# Patient Record
Sex: MALE | Race: WHITE
[De-identification: names, ages, dates, MRNs, and addresses within clinical notes are randomized per-mention and may not be internally consistent; named-entity substitution may affect disease eponyms.]

---

## 2017-07-16 ENCOUNTER — HOSPITAL ENCOUNTER (EMERGENCY)
Dept: HOSPITAL 62 - ER | Age: 22
LOS: 1 days | Discharge: HOME | End: 2017-07-17
Payer: COMMERCIAL

## 2017-07-16 VITALS — SYSTOLIC BLOOD PRESSURE: 135 MMHG | DIASTOLIC BLOOD PRESSURE: 70 MMHG

## 2017-07-16 DIAGNOSIS — F17.200: ICD-10-CM

## 2017-07-16 DIAGNOSIS — K61.1: Primary | ICD-10-CM

## 2017-07-16 PROCEDURE — 10080 I&D PILONIDAL CYST SIMPLE: CPT

## 2017-07-16 PROCEDURE — A6266 IMPREG GAUZE NO H20/SAL/YARD: HCPCS

## 2017-07-16 PROCEDURE — 0H98XZZ DRAINAGE OF BUTTOCK SKIN, EXTERNAL APPROACH: ICD-10-PCS | Performed by: EMERGENCY MEDICINE

## 2017-07-16 PROCEDURE — 96372 THER/PROPH/DIAG INJ SC/IM: CPT

## 2017-07-16 PROCEDURE — 99283 EMERGENCY DEPT VISIT LOW MDM: CPT

## 2017-07-16 PROCEDURE — S0119 ONDANSETRON 4 MG: HCPCS

## 2017-07-16 NOTE — ER DOCUMENT REPORT
ED Skin Rash/Insect Bite/Abscs





- General


Chief Complaint: Rectal Abscess


Stated Complaint: POSSIBLE ABSCESS


Time Seen by Provider: 07/16/17 22:17


Notes: 


Patient is a 22-year-old male who comes emergency department for chief 

complaint of 3 days of worsening pain in his left upper buttock area and lower 

back.  He states that it is difficult to sit down.  He has no trouble with 

bowel movements.  He denies fever or chills.  He is not a diabetic.  He denies 

history of the same.





TRAVEL OUTSIDE OF THE [place] IN LAST 30 DAYS: No





- Related Data


Allergies/Adverse Reactions: 


 





No Known Allergies Allergy (Unverified 11/28/16 14:02)


 











Past Medical History





- General


Information source: Patient





- Social History


Smoking Status: Current Every Day Smoker


Frequency of alcohol use: None


Drug Abuse: None


Lives with: Friend


Family History: None


Patient has suicidal ideation: No


Patient has homicidal ideation: No


Pulmonary Medical History: Reports: Hx Pneumonia


Renal/ Medical History: Denies: Hx Peritoneal Dialysis


Surgical Hx: Negative





- Immunizations


Hx Diphtheria, Pertussis, Tetanus Vaccination: Yes





Review of Systems





- Review of Systems


Constitutional: No symptoms reported


EENT: No symptoms reported


Cardiovascular: No symptoms reported


Respiratory: No symptoms reported


Gastrointestinal: No symptoms reported


Genitourinary: No symptoms reported


Male Genitourinary: No symptoms reported


Musculoskeletal: No symptoms reported


Skin: See HPI


Hematologic/Lymphatic: No symptoms reported


Neurological/Psychological: No symptoms reported





Physical Exam





- Vital signs


Vitals: 


 











Temp Pulse Resp BP Pulse Ox


 


 99.1 F   93   18   135/70 H  98 


 


 07/16/17 21:49  07/16/17 21:49  07/16/17 21:49  07/16/17 21:49  07/16/17 21:49











Interpretation: Normal





- General


General appearance: Appears well, Alert


In distress: None - Patient has difficulty sitting comfortably but otherwise he 

does not appear to be in distress





- HEENT


Head: Normocephalic, Atraumatic


Eyes: Normal


Conjunctiva: Normal


Extraocular movements intact: Yes


Eyelashes: Normal


Pupils: PERRL


Sinus: Normal


Nasal: Normal


Mouth/Lips: Normal


Mucous membranes: Normal


Pharynx: Normal


Neck: Normal





- Respiratory


Respiratory status: No respiratory distress


Chest status: Nontender


Breath sounds: Normal.  No: Decreased air movement, Wheezing


Chest palpation: Normal





- Cardiovascular


Rhythm: Regular.  No: Tachycardia


Heart sounds: Normal auscultation, S1 appreciated, S2 appreciated


Murmur: No





- Abdominal


Inspection: Normal


Distension: No distension


Bowel sounds: Normal


Tenderness: Nontender


Organomegaly: No organomegaly





- Back


Back: Normal, Nontender.  No: Tender





- Extremities


General upper extremity: Normal inspection, Nontender, Normal ROM, Normal 

strength


General lower extremity: Normal inspection, Nontender, Normal ROM, Normal 

strength





- Neurological


Neuro grossly intact: Yes


Cognition: Normal


Orientation: AAOx4


[place] Coma Scale Eye Opening: Spontaneous


[place] Coma Scale Verbal: Oriented


Carlos Alberto Coma Scale Motor: Obeys Commands


Carlos Alberto Coma Scale Total: 15


Speech: Normal


Cranial nerves: Normal


Cerebellar coordination: Normal


Motor strength normal: LUE, RUE, LLE, RLE


Additional motor exam normals: Equal 


Sensory: Normal





- Psychological


Associated symptoms: Normal affect, Normal mood





- Skin


Skin Temperature: Warm


Skin Moisture: Dry


Skin Color: Normal


Skin irregularity: Abscess - Left-sided pilonidal cyst abscess, mild 

surrounding erythema, mild swelling to the area, fluctuant.





Course





- Re-evaluation


Re-evalutation: 


07/16/17 22:31


[facility] [place] [place] this before, however patient 

specifically tells me this is not true, this is the first time.





Patient with pilonidal cyst abscess, this was cleaned, drained, packed, patient 

placed on antibiotics, patient given pain medication.  Discussed care, follow-up

, return precautions, patient also given surgical referral in case he needs it.

  This was discussed in detail.  Patient states understanding and agreement.





- Vital Signs


Vital signs: 


 











Temp Pulse Resp BP Pulse Ox


 


 99.1 F   93   18   135/70 H  98 


 


 07/16/17 21:49  07/16/17 21:49  07/16/17 21:49  07/16/17 21:49  07/16/17 21:49














Procedures





- Incision and Drainage


  ** left sided pilonidal cyst abscess


Type: Single


Anesthetic type: 1% Lidocaine


mL's of anesthetic: 8


I&D procedure: Shurclens applied - surgical cleanser, Sterile dressing applied


Incision Method: Incision made by scalpel


Notes: 


About 20 ccs of purulent material expressed; explored, cleaned, packed.





Discharge





- Discharge


Clinical Impression: 


 Pilonidal cyst with abscess





Condition: Stable


Disposition: HOME, SELF-CARE


Additional Instructions: 


You have been evaluated and treated today for a pilonidal cyst abscess.


Packing needs to come out/be exchanged in hours.


Keep clean, keep absorbing dressing over the site, take the antibiotic 

prescribed.


The pain medication if needed.


I recommend followup with the surgical referral if this continues to happen.


Return to emergency department immediately for any concerning or worsening 

symptoms including severe pain or swelling, spreading redness, fever, etc.


Prescriptions: 


Morphine Sulfate [Morphine Ir 15 Mg Tablet] 15 mg PO Q4HP PRN #12 tablet


 PRN Reason: 


Sulfamethoxazole/Trimethoprim [Bactrim Ds Tablet] 1 each PO BID #10 tablet


Forms:  Return to Work


Referrals: 


[facility] [Provider Group] - Follow up as needed

## 2017-07-18 ENCOUNTER — HOSPITAL ENCOUNTER (EMERGENCY)
Dept: HOSPITAL 62 - ER | Age: 22
Discharge: HOME | End: 2017-07-18
Payer: COMMERCIAL

## 2017-07-18 VITALS — SYSTOLIC BLOOD PRESSURE: 140 MMHG | DIASTOLIC BLOOD PRESSURE: 49 MMHG

## 2017-07-18 DIAGNOSIS — F17.200: ICD-10-CM

## 2017-07-18 DIAGNOSIS — Z48.01: Primary | ICD-10-CM

## 2017-07-18 PROCEDURE — 99282 EMERGENCY DEPT VISIT SF MDM: CPT

## 2017-07-18 NOTE — ER DOCUMENT REPORT
HPI





- HPI


Patient complains to provider of: Abscess I and D wound check


Onset: Other - Sunday


Onset/Duration: Better


Pain Level: 3


Context: 


22-year-old male had a pilonidal cyst abscess I&D done Sunday.  He is here for 

a wound recheck.  Feels a lot better and he has been going to work.  He came 

today to get the packing removed.  No fever.


Associated Symptoms: None


Exacerbated by: Denies


Relieved by: Denies


Similar symptoms previously: No


Recently seen / treated by doctor: No





- ROS


ROS below otherwise negative: Yes


Systems Reviewed and Negative: Yes All other systems reviewed and negative





- REPRODUCTIVE


Reproductive: DENIES: Pregnant:





- DERM


Skin Color: Normal





Past Medical History





- General


Information source: Patient





- Social History


Smoking Status: Current Every Day Smoker


Frequency of alcohol use: None


Drug Abuse: None


Lives with: Spouse/Significant other


Family History: None


Patient has suicidal ideation: No


Patient has homicidal ideation: No


Pulmonary Medical History: Reports: Hx Pneumonia


Renal/ Medical History: Denies: Hx Peritoneal Dialysis


Past Surgical History: Reports: Hx Oral Surgery - wisdom teeth





- Immunizations


Hx Diphtheria, Pertussis, Tetanus Vaccination: Yes





Vertical Provider Document





- CONSTITUTIONAL


Agree With Documented VS: Yes


Exam Limitations: No Limitations


General Appearance: No Apparent Distress





- INFECTION CONTROL


TRAVEL OUTSIDE OF THE [place] IN LAST 30 DAYS: No





- HEENT


HEENT: Normocephalic





- NECK


Neck: Supple





- RESPIRATORY


O2 Sat by Pulse Oximetry: 97





- NEURO


Level of Consciousness: Awake, Alert, Appropriate





- DERM


Integumentary: Abscess - Packing removed from the left pilonidal abscess 

incision and drainage there is minimal erythema and pus.





Course





- Vital Signs


Vital signs: 


 











Temp Pulse Resp BP Pulse Ox


 


 99.1 F   75   17   140/49 H  97 


 


 07/18/17 18:20  07/18/17 18:20  07/18/17 18:20  07/18/17 18:20  07/18/17 18:20














Discharge





- Discharge


Clinical Impression: 


 Abscess I and D recheck





Condition: Good


Disposition: HOME, SELF-CARE


Instructions:  Abscess ([facility])


Additional Instructions: 


Shower daily using antibacterial soap and vigorous washing with a washcloth in 

the shower pressure over the wound





Finish antibiotics





Dry dressing





return to the emergency room any concerns





Please complete the patient satisfaction survey if you get one, and return it.. 

If you do not receive a survey,  then you can go to the Critical access hospital website, [email or web address] 

and place your comments about your very good care. Thank you very much. It was 

a pleasure being your medical provider today.

## 2017-07-30 ENCOUNTER — HOSPITAL ENCOUNTER (EMERGENCY)
Dept: HOSPITAL 62 - ER | Age: 22
Discharge: HOME | End: 2017-07-30
Payer: COMMERCIAL

## 2017-07-30 VITALS — DIASTOLIC BLOOD PRESSURE: 61 MMHG | SYSTOLIC BLOOD PRESSURE: 140 MMHG

## 2017-07-30 DIAGNOSIS — H60.501: Primary | ICD-10-CM

## 2017-07-30 DIAGNOSIS — F17.200: ICD-10-CM

## 2017-07-30 DIAGNOSIS — R03.0: ICD-10-CM

## 2017-07-30 DIAGNOSIS — H92.01: ICD-10-CM

## 2017-07-30 PROCEDURE — 99282 EMERGENCY DEPT VISIT SF MDM: CPT

## 2017-07-30 NOTE — ER DOCUMENT REPORT
HPI





- HPI


Patient complains to provider of: r ear pain


Onset: Other - 3 days


Onset/Duration: Persistent


Quality of pain: Sharp


Pain Level: 4


Context: 


Patient presents complaining of right ear pain with swelling and drainage from 

his right ear.  Symptoms have been going on for the past 3 days.  Patient 

denies any fever.


Associated Symptoms: Earache.  denies: Fever


Exacerbated by: Denies


Relieved by: Denies


Similar symptoms previously: No


Recently seen / treated by doctor: No





- ROS


ROS below otherwise negative: Yes


Systems Reviewed and Negative: Yes All other systems reviewed and negative





- CONSTITUTIONAL


Constitutional: DENIES: Fever





- EENT


EENT: REPORTS: Ear Pain





- GASTROINTESTINAL


Gastrointestinal: DENIES: Nausea, Patient vomiting





- REPRODUCTIVE


Reproductive: DENIES: Pregnant:





- MUSCULOSKELETAL


Musculoskeletal: DENIES: Neck Pain





- DERM


Skin Color: Normal


Skin Problems: None





Past Medical History





- General


Information source: Patient





- Social History


Smoking Status: Current Every Day Smoker


Frequency of alcohol use: None


Drug Abuse: None


Occupation: 


Family History: None


Patient has suicidal ideation: No


Patient has homicidal ideation: No





- Medical History


Medical History: Negative


Pulmonary Medical History: Reports: Hx Pneumonia


Renal/ Medical History: Denies: Hx Peritoneal Dialysis


Past Surgical History: Reports: Hx Oral Surgery - wisdom teeth





- Immunizations


Hx Diphtheria, Pertussis, Tetanus Vaccination: Yes





Vertical Provider Document





- CONSTITUTIONAL


Agree With Documented VS: Yes


Exam Limitations: No Limitations


General Appearance: WD/WN, No Apparent Distress





- INFECTION CONTROL


TRAVEL OUTSIDE OF THE [place] IN LAST 30 DAYS: No





- HEENT


HEENT: Atraumatic, Normocephalic.  negative: Pharyngeal Exudate, Pharyngeal 

Tenderness, Pharyngeal Erythema


Notes: 


Patient with tenderness with movement of right helix, right preauricular 

lymphadenopathy.  Right external auditory canal swollen with exudate.  Patient 

without any mastoid tenderness or swelling.





- NECK


Neck: Lymphadenopathy-Right - preauricular





- RESPIRATORY


Respiratory: Breath Sounds Normal, No Respiratory Distress


O2 Sat by Pulse Oximetry: 95





- CARDIOVASCULAR


Cardiovascular: Regular Rate, Regular Rhythm





- MUSCULOSKELETAL/EXTREMETIES


Musculoskeletal/Extremeties: MAEW





- NEURO


Level of Consciousness: Awake, Alert, Appropriate


Motor/Sensory: No Motor Deficit





- DERM


Integumentary: Warm, Dry, No Rash





Course





- Re-evaluation


Re-evalutation: 


07/30/17 07:33


The patient has been informed that they may have pre-hypertension or 

hypertension based on a blood pressure reading in the emergency department.  I 

recommend that patient call the primary care provider listed on their discharge 

instructions or a physician of their choice by this week to arrange follow-up 

for further evaluation of possible pre-hypertension or hypertension.





07/30/17 08:06


modified ear wick made from plain packing gauze placed in R EAC. Drops 

administered. Pt advised to remove packing if it does not fall out at the end 

of the 7 day.





- Vital Signs


Vital signs: 


 











Temp Pulse Resp BP Pulse Ox


 


 98.8 F   94   18   140/61 H  95 


 


 07/30/17 07:12  07/30/17 07:12  07/30/17 07:12  07/30/17 07:12  07/30/17 07:12














Discharge





- Discharge


Clinical Impression: 


 Elevated blood pressure reading





Otitis externa


Qualifiers:


 Otitis externa type: unspecified type Chronicity: acute Laterality: right 

Qualified Code(s): H60.501 - Unspecified acute noninfective otitis externa, 

right ear





Disposition: HOME, SELF-CARE


Instructions:  Use of Ear Drops ([facility]), Otitis Externa ([facility]), Oral Narcotic 

Medication ([facility])


Additional Instructions: 


Return immediately for any new or worsening symptoms





Followup with your primary care provider, call tomorrow to make a followup 

appointment





Ciprodex, instill 4 drops to right ear twice a day for 7 days.


Prescriptions: 


Hydrocodone/Acetaminophen [Norco 5-325 Tablet] 1 each PO Q4 PRN #10 tablet


 PRN Reason: 


Forms:  Elevated Blood Pressure


Referrals: 


[facility] [Provider Group] - Follow up as needed

## 2019-03-05 ENCOUNTER — HOSPITAL ENCOUNTER (EMERGENCY)
Dept: HOSPITAL 62 - ER | Age: 24
LOS: 1 days | Discharge: HOME | End: 2019-03-06
Payer: COMMERCIAL

## 2019-03-05 DIAGNOSIS — Z88.0: ICD-10-CM

## 2019-03-05 DIAGNOSIS — R45.851: Primary | ICD-10-CM

## 2019-03-05 DIAGNOSIS — F17.210: ICD-10-CM

## 2019-03-05 LAB
ADD MANUAL DIFF: NO
ALBUMIN SERPL-MCNC: 5.5 G/DL (ref 3.5–5)
ALP SERPL-CCNC: 94 U/L (ref 38–126)
ALT SERPL-CCNC: 36 U/L (ref 21–72)
ANION GAP SERPL CALC-SCNC: 17 MMOL/L (ref 5–19)
APAP SERPL-MCNC: < 10 UG/ML (ref 10–30)
AST SERPL-CCNC: 29 U/L (ref 17–59)
BASOPHILS # BLD AUTO: 0.1 10^3/UL (ref 0–0.2)
BASOPHILS NFR BLD AUTO: 0.5 % (ref 0–2)
BILIRUB DIRECT SERPL-MCNC: 0.3 MG/DL (ref 0–0.4)
BILIRUB SERPL-MCNC: 0.8 MG/DL (ref 0.2–1.3)
BUN SERPL-MCNC: 12 MG/DL (ref 7–20)
CALCIUM: 10.8 MG/DL (ref 8.4–10.2)
CHLORIDE SERPL-SCNC: 105 MMOL/L (ref 98–107)
CO2 SERPL-SCNC: 20 MMOL/L (ref 22–30)
EOSINOPHIL # BLD AUTO: 0.1 10^3/UL (ref 0–0.6)
EOSINOPHIL NFR BLD AUTO: 0.6 % (ref 0–6)
ERYTHROCYTE [DISTWIDTH] IN BLOOD BY AUTOMATED COUNT: 13.3 % (ref 11.5–14)
ETHANOL SERPL-MCNC: < 10 MG/DL
GLUCOSE SERPL-MCNC: 102 MG/DL (ref 75–110)
HCT VFR BLD CALC: 50.1 % (ref 37.9–51)
HGB BLD-MCNC: 17.6 G/DL (ref 13.5–17)
LYMPHOCYTES # BLD AUTO: 3.1 10^3/UL (ref 0.5–4.7)
LYMPHOCYTES NFR BLD AUTO: 22.4 % (ref 13–45)
MCH RBC QN AUTO: 31.1 PG (ref 27–33.4)
MCHC RBC AUTO-ENTMCNC: 35.1 G/DL (ref 32–36)
MCV RBC AUTO: 88 FL (ref 80–97)
MONOCYTES # BLD AUTO: 0.8 10^3/UL (ref 0.1–1.4)
MONOCYTES NFR BLD AUTO: 5.6 % (ref 3–13)
NEUTROPHILS # BLD AUTO: 9.9 10^3/UL (ref 1.7–8.2)
NEUTS SEG NFR BLD AUTO: 70.9 % (ref 42–78)
PLATELET # BLD: 318 10^3/UL (ref 150–450)
POTASSIUM SERPL-SCNC: 4.2 MMOL/L (ref 3.6–5)
PROT SERPL-MCNC: 8.5 G/DL (ref 6.3–8.2)
RBC # BLD AUTO: 5.67 10^6/UL (ref 4.35–5.55)
SALICYLATES SERPL-MCNC: < 1 MG/DL (ref 2–20)
SODIUM SERPL-SCNC: 142.1 MMOL/L (ref 137–145)
TOTAL CELLS COUNTED % (AUTO): 100 %
WBC # BLD AUTO: 14 10^3/UL (ref 4–10.5)

## 2019-03-05 PROCEDURE — 80053 COMPREHEN METABOLIC PANEL: CPT

## 2019-03-05 PROCEDURE — 36415 COLL VENOUS BLD VENIPUNCTURE: CPT

## 2019-03-05 PROCEDURE — 80307 DRUG TEST PRSMV CHEM ANLYZR: CPT

## 2019-03-05 PROCEDURE — 96372 THER/PROPH/DIAG INJ SC/IM: CPT

## 2019-03-05 PROCEDURE — 93005 ELECTROCARDIOGRAM TRACING: CPT

## 2019-03-05 PROCEDURE — 99285 EMERGENCY DEPT VISIT HI MDM: CPT

## 2019-03-05 PROCEDURE — 81001 URINALYSIS AUTO W/SCOPE: CPT

## 2019-03-05 PROCEDURE — 85025 COMPLETE CBC W/AUTO DIFF WBC: CPT

## 2019-03-05 PROCEDURE — 93010 ELECTROCARDIOGRAM REPORT: CPT

## 2019-03-05 RX ADMIN — LORAZEPAM ONE MG: 2 INJECTION INTRAMUSCULAR; INTRAVENOUS at 20:05

## 2019-03-05 RX ADMIN — HALOPERIDOL LACTATE ONE MG: 5 INJECTION, SOLUTION INTRAMUSCULAR at 20:06

## 2019-03-05 RX ADMIN — HALOPERIDOL LACTATE ONE: 5 INJECTION, SOLUTION INTRAMUSCULAR at 20:20

## 2019-03-05 RX ADMIN — LORAZEPAM ONE: 2 INJECTION INTRAMUSCULAR; INTRAVENOUS at 20:20

## 2019-03-05 RX ADMIN — DIPHENHYDRAMINE HYDROCHLORIDE ONE MG: 50 INJECTION INTRAMUSCULAR; INTRAVENOUS at 20:05

## 2019-03-05 RX ADMIN — DIPHENHYDRAMINE HYDROCHLORIDE ONE: 50 INJECTION INTRAMUSCULAR; INTRAVENOUS at 20:20
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
## 2019-03-06 VITALS — SYSTOLIC BLOOD PRESSURE: 128 MMHG | DIASTOLIC BLOOD PRESSURE: 68 MMHG

## 2019-03-06 LAB
ADD MANUAL DIFF: NO
APPEARANCE UR: (no result)
APTT PPP: YELLOW S
BARBITURATES UR QL SCN: NEGATIVE
BASOPHILS # BLD AUTO: 0.1 10^3/UL (ref 0–0.2)
BASOPHILS NFR BLD AUTO: 0.6 % (ref 0–2)
BILIRUB UR QL STRIP: NEGATIVE
EOSINOPHIL # BLD AUTO: 0.1 10^3/UL (ref 0–0.6)
EOSINOPHIL NFR BLD AUTO: 1.6 % (ref 0–6)
ERYTHROCYTE [DISTWIDTH] IN BLOOD BY AUTOMATED COUNT: 13.1 % (ref 11.5–14)
GLUCOSE UR STRIP-MCNC: NEGATIVE MG/DL
HCT VFR BLD CALC: 46.3 % (ref 37.9–51)
HGB BLD-MCNC: 16.1 G/DL (ref 13.5–17)
KETONES UR STRIP-MCNC: (no result) MG/DL
LYMPHOCYTES # BLD AUTO: 2.3 10^3/UL (ref 0.5–4.7)
LYMPHOCYTES NFR BLD AUTO: 24.7 % (ref 13–45)
MCH RBC QN AUTO: 30.7 PG (ref 27–33.4)
MCHC RBC AUTO-ENTMCNC: 34.9 G/DL (ref 32–36)
MCV RBC AUTO: 88 FL (ref 80–97)
METHADONE UR QL SCN: NEGATIVE
MONOCYTES # BLD AUTO: 0.8 10^3/UL (ref 0.1–1.4)
MONOCYTES NFR BLD AUTO: 8.4 % (ref 3–13)
NEUTROPHILS # BLD AUTO: 6 10^3/UL (ref 1.7–8.2)
NEUTS SEG NFR BLD AUTO: 64.7 % (ref 42–78)
NITRITE UR QL STRIP: NEGATIVE
PCP UR QL SCN: NEGATIVE
PH UR STRIP: 6 [PH] (ref 5–9)
PLATELET # BLD: 232 10^3/UL (ref 150–450)
PROT UR STRIP-MCNC: 30 MG/DL
RBC # BLD AUTO: 5.26 10^6/UL (ref 4.35–5.55)
SP GR UR STRIP: 1.03
TOTAL CELLS COUNTED % (AUTO): 100 %
URINE AMPHETAMINES SCREEN: NEGATIVE
URINE BENZODIAZEPINES SCREEN: NEGATIVE
URINE COCAINE SCREEN: NEGATIVE
URINE MARIJUANA (THC) SCREEN: (no result)
UROBILINOGEN UR-MCNC: NEGATIVE MG/DL (ref ?–2)
WBC # BLD AUTO: 9.3 10^3/UL (ref 4–10.5)

## 2019-03-06 NOTE — ER DOCUMENT REPORT
Doctor's Note


Notes: 





03/06/19 10:52


Patient seen and examined.  Evidently the patient had made suicidal threats last

night.  He states he is no longer suicidal.  He is actually due to start 

counseling today, is looking forward to that.  He does admit to feeling anxious,

wants a cigarette.  He does have a history of suicide attempt several years ago 

before he quit drinking.  He no longer drinks at all.  He feels comfortable, and

in fact is very much looking forward to discharge.


Patient is mildly anxious in appearance but pleasant, cooperative, makes good 

eye contact.  Heart is regular rate and rhythm, lungs are clear to auscultation 

bilaterally.  Abdomen soft, nontender, normoactive bowel sounds.


Plan is for discharge.  Patient given a nicotine patch prior to discharge.  No 

medication recommendations as he is currently starting counseling.  Patient is 

amenable to this plan.

## 2019-03-07 NOTE — PSYCHOLOGICAL NOTE
Psych Note





- Psych Note


Date seen by psych provider: 03/06/19


Time seen by psych provider: 09:50


Psych Note: 


Reason for Consult: suicidal ideation





Patient is a 24-year-old male with ADHD, manic depression presents to the 

emergency department accompanied by IFS worker due to suicidal ideation.  





Clinician conducted phone consult on 3/5/2019 upon initial arrival


Patient reports that he was feeling anxious.  He states he has had 2 good 

interviews; however, did not want to be alone.  He was unable to explain 

incongruent emotions of positive feelings between having a good interview to 

feelings of wanting to harm himself.  He continued to report that he is not on 

medication.  He has been sober for 5 years and does normally work as a 

.  He currently is in between jobs and that has created a lot of 

stress.  Patient reports he just wants to go home that he feels the hospital 

will make him worse.





Clinician spoke with mobile crisis worker, Nelida, who reports that they have been

working with the patient since early afternoon.  Patient reportedly had a couple

good interviews and one came home got into an argument with his ex-girlfriend.  

They currently still live together however when she was told he possibly be 

getting a job she mentioned that she will be moving out.  This resulted in the 

patient reporting he wanted to kill himself.  She reports they put into place a 

line of action where the patient would not be home alone; however, for some 

unknown reason the people that were part of the plan left resulted in the 

patient being home alone again with his ex-girlfriend.  At that point the 

patient was screaming at at his ex-girlfriend and continued to voice suicidal 

ideation.  She reports the patient is diagnosed with bipolar however is not 

taking medication.  She confirms there was weapons in the home however 

personally witnessed the weapons being removed.  She reports the patient 

witnessed his father being killed when he was young.  He has been sober for 5 

years.  She reports a concern is that they had a plan of care that for unknown 

reasons dissolved.  Patient is unable to stay with his girlfriend because of the

discord.





Patient was recommended for IVC petition for overnight mental health hold and 

observation.  Patient will be reevaluated in the morning and person.








Evaluation in person 3/6/2019


Patient reports he is feeling "better just still a little anxious."  He reports 

that he feels that stemming from being in the hospital itself not from last 

night's events.  Patient apologized for his behavior last evening.  Clinician 

notes patient's behavior escalated after phone consult and being told he had to 

stay overnight.  Patient identifies Carlos Pelayo as a roommate and provided 

consent for clinician to speak with him.  He reports that he has other friends 

that are willing to assist in monitoring the patient.  He disclosed that he does

have an interview today at 4 PM and is very concerned about missing this.  He 

continued to report that he [facility].  He states that while he does not want to take medication if 

this is recommended by the behavioral health team he will take the medications.





Clinician spoke with patient's roommate, Carlos.  He reports that he was not part

of the plan of care last night with the patient.  He states that their friend 

was with him however had to leave for work and they were hoping that the patient

be able to stay with his ex-girlfriend until at he himself got off of work; 

however, it dissolved before he even got off work.  He discloses that he is home

for the patient today and confirms he will continue working with the patient to 

ensure he does not have access to medications and will follow with mental health

recommendations.  He has no concerns with the patient returning home and states 

that he knows that the patient is very concerned about missing a job interview 

has this is been a significant stressor.  He reports that the patient's ex-

girlfriend has moved out of their apartment so there will be no contact between 

the 2.





Clinician spoke with patient to discuss the fact that the girlfriend moved out 

of the apartment.  He reports that he asked her to and is glad that she is 

followed through with that.  He discloses appropriate affect and acknowledging 

needing to learn positive coping skills.





No medication recommendations at this time





Unspecified bipolar and related disorder


Alcohol abuse per history; 5 years sober





Impression\plan: Patient is recommended for rescind of IVC and is cleared from 

acute psychiatric services.  Patient  presentation has greatly improved.  He has

had time to cool down is able to engage appropriate with clinician.  Patient is 

able to engage in problem solving and solution focused techniques with clinician

to identify working with outpatient mental health services in the form of 

therapy.  Patient agrees to engage in services and allow time to work with a 

therapist to determine if medication is needed.  Patient's roommate, Carlos, 

agrees to be part of patient's plan of care i.e. no access to medications and 

weapons and follows through with mental health recommendations.  All weapons 

have been removed from the home and the significant stressor of the ex-

girlfriend living in the home has moved out during his [facility] visit.  Patient has 

an appointment with [facility] for continued outpatient mental 

health treatment.  Dr. Shabazz was consulted and the care management this 

patient; attending physicians in agreement with recommendations and disposition.

## 2019-03-23 ENCOUNTER — HOSPITAL ENCOUNTER (EMERGENCY)
Dept: HOSPITAL 62 - ER | Age: 24
Discharge: HOME | End: 2019-03-23
Payer: SELF-PAY

## 2019-03-23 VITALS — SYSTOLIC BLOOD PRESSURE: 110 MMHG | DIASTOLIC BLOOD PRESSURE: 60 MMHG

## 2019-03-23 DIAGNOSIS — F17.200: ICD-10-CM

## 2019-03-23 DIAGNOSIS — L05.91: Primary | ICD-10-CM

## 2019-03-23 DIAGNOSIS — L03.317: ICD-10-CM

## 2019-03-23 PROCEDURE — 99282 EMERGENCY DEPT VISIT SF MDM: CPT

## 2019-03-23 PROCEDURE — A6266 IMPREG GAUZE NO H20/SAL/YARD: HCPCS

## 2019-03-23 NOTE — ER DOCUMENT REPORT
ED General





- General


Chief Complaint: Cyst


Stated Complaint: POSSIBLE ABSCESS


Time Seen by Provider: 03/23/19 17:06


Mode of Arrival: Ambulatory


Notes: 





Patient is a 23-year-old male with a past oral history of a recurrent pilonidal 

cyst and requiring repeated drainages for the abscess to the same area who 

presents with 4 days of progressively worsening pain and swelling to the left 

gluteal cleft.  I saw this patient in October 2018 for the same and he states 

that it feels identical to that.  He does describe having a severe, constant, 

aching pain to the area.  States any application of pressure such as trying to 

sit down triggers or worsens the pain.  He has been trying Tylenol and ibuprofen

without any significant improvement in the pain.  He denies any fever or 

constitutional symptoms.  He is planning to go to [place] and has an 

appointment scheduled for surgery for definitive management at that time as he 

did not follow-up after our last visit.


TRAVEL OUTSIDE OF THE [place] IN LAST 30 DAYS: No





- Related Data


Allergies/Adverse Reactions: 


                                        





amoxicillin Allergy (Verified 03/05/19 17:54)


   











Past Medical History





- General


Information source: Patient





- Social History


Smoking Status: Current Every Day Smoker


Chew tobacco use (# tins/day): No


Frequency of alcohol use: None


Drug Abuse: Marijuana


Lives with: Spouse/Significant other


Family History: Reviewed & Not Pertinent


Patient has suicidal ideation: No


Patient has homicidal ideation: No


Pulmonary Medical History: Reports: Hx Pneumonia


Renal/ Medical History: Denies: Hx Peritoneal Dialysis


Psychiatric Medical History: Reports: Hx Attention Deficit Hyperactivity 

Disorder, Hx Depression


Past Surgical History: Reports: Hx Oral Surgery - wisdom teeth, Hx Tonsillectomy

- adenoids





- Immunizations


Hx Diphtheria, Pertussis, Tetanus Vaccination: Yes





Review of Systems





- Review of Systems


Notes: 





Constitutional: Negative for fever.


HENT: Negative for sore throat.


Eyes: Negative for visual changes.


Cardiovascular: Negative for chest pain.


Respiratory: Negative for shortness of breath.


Gastrointestinal: Negative for abdominal pain, vomiting or diarrhea.


Genitourinary: Negative for dysuria.


Musculoskeletal: Negative for back pain.


Skin: Positive for buttock abscess


Neurological: Negative for headaches, weakness or numbness.





10 point ROS negative except as marked above and in HPI.





Physical Exam





- Vital signs


Vitals: 





                                        











Temp Pulse Resp BP Pulse Ox


 


 98.4 F   94   16   110/58 L  98 


 


 03/23/19 16:24  03/23/19 16:24  03/23/19 16:24  03/23/19 16:24  03/23/19 16:24











Interpretation: Normal


Notes: 





PHYSICAL EXAMINATION:





GENERAL: Well-appearing, well-nourished and in no acute distress.





HEAD: Atraumatic, normocephalic.





EYES:  sclera anicteric, conjunctiva are normal.





ENT: Moist mucous membranes.





NECK: Normal range of motion





LUNGS: Normal work of breathing





HEART: 2+ radial pulses bilaterally





EXTREMITIES: no pitting or edema.  No cyanosis.





NEUROLOGICAL: No focal neurological deficits. Moves all extremities 

spontaneously and on command.





PSYCH: Normal mood, normal affect.





SKIN: Warm, Dry, normal turgor, 2 x 2 cm pilonidal abscess to the left gluteal 

cleft with a 3 x 3 cm area of surrounding cellulitis





Course





- Re-evaluation


Re-evalutation: 





03/23/19 20:36


Patient presents with a pilonidal abscess of the left gluteal cleft.  Area was 

anesthetized, incised and drained without any difficulty.  Patient tolerated 

procedure well.  Packing was placed.  Patient has been started on trimethoprim 

sulfamethoxazole, first dose given here in the emergency department.  Vitals 

within normal limits.  No indication for labs or imaging.  Patient has scheduled

surgical follow-up in [place] where he is moving tomorrow.  At this time will 

discharge with return precautions and follow-up recommendations.  Verbal 

discharge instructions given a the bedside and opportunity for questions given. 

Medication warnings reviewed. Patient is in agreement with this plan and has 

verbalized understanding of return precautions and the need for primary care 

follow-up in the next 24-72 hours.





- Vital Signs


Vital signs: 





                                        











Temp Pulse Resp BP Pulse Ox


 


 98.4 F   94   16   110/58 L  98 


 


 03/23/19 16:24  03/23/19 16:24  03/23/19 16:24  03/23/19 16:24  03/23/19 16:24














Discharge





- Discharge


Clinical Impression: 


 Pilonidal abscess, Cellulitis of buttock, left





Condition: Good


Disposition: HOME, SELF-CARE


Additional Instructions: 


You were seen for an abscess that required drainage. Please clean this area with

soap and water twice daily and apply a topical antibiotic. Dress the area after 

each cleaning.  Please return if you develop fever, vomiting, the pain at the 

site worsens, you notice spreading redness from the area, or you have any other 

symptoms that are concerning to you.





If the packing has not fallen out within 2 days please remove it.  Please take 

all antibiotics until they are completed.


Prescriptions: 


Sulfamethoxazole/Trimethoprim [Bactrim Ds Tablet] 2 tab PO BID #28 tablet

## 2019-03-23 NOTE — ER DOCUMENT REPORT
ED Medical Screen (RME)





- General


Chief Complaint: Cyst


Stated Complaint: POSSIBLE ABSCESS


Time Seen by Provider: 03/23/19 17:06


Mode of Arrival: Ambulatory


Information source: Patient


TRAVEL OUTSIDE OF THE [place] IN LAST 30 DAYS: No





- HPI


Patient complains to provider of: pilonidal cyst


Onset: Yesterday - pt with h/o recurrent pilonidal cyst with painful swollen 

cyst in usual location.





- Related Data


Allergies/Adverse Reactions: 


                                        





amoxicillin Allergy (Verified 03/05/19 17:54)


   











Past Medical History


Pulmonary Medical History: Reports: Hx Pneumonia


Renal/ Medical History: Denies: Hx Peritoneal Dialysis


Psychiatric Medical History: Reports: Hx Attention Deficit Hyperactivity 

Disorder, Hx Depression


Past Surgical History: Reports: Hx Oral Surgery - wisdom teeth, Hx Tonsillectomy

- adenoids





- Immunizations


Hx Diphtheria, Pertussis, Tetanus Vaccination: Yes





Physical Exam





- Vital signs


Vitals: 





                                        











Temp Pulse Resp BP Pulse Ox


 


 98.4 F   94   16   110/58 L  98 


 


 03/23/19 16:24  03/23/19 16:24  03/23/19 16:24  03/23/19 16:24  03/23/19 16:24














Course





- Vital Signs


Vital signs: 





                                        











Temp Pulse Resp BP Pulse Ox


 


 98.4 F   94   16   110/58 L  98 


 


 03/23/19 16:24  03/23/19 16:24  03/23/19 16:24  03/23/19 16:24  03/23/19 16:24